# Patient Record
Sex: FEMALE | Race: BLACK OR AFRICAN AMERICAN | NOT HISPANIC OR LATINO | Employment: FULL TIME | URBAN - METROPOLITAN AREA
[De-identification: names, ages, dates, MRNs, and addresses within clinical notes are randomized per-mention and may not be internally consistent; named-entity substitution may affect disease eponyms.]

---
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
## 2025-04-17 ENCOUNTER — OFFICE VISIT (OUTPATIENT)
Dept: OBSTETRICS AND GYNECOLOGY | Age: 20
End: 2025-04-17
Payer: COMMERCIAL

## 2025-04-17 VITALS
SYSTOLIC BLOOD PRESSURE: 114 MMHG | HEIGHT: 65 IN | DIASTOLIC BLOOD PRESSURE: 66 MMHG | BODY MASS INDEX: 16.86 KG/M2 | WEIGHT: 101.2 LBS

## 2025-04-17 DIAGNOSIS — R63.6 UNDERWEIGHT (BMI < 18.5): ICD-10-CM

## 2025-04-17 DIAGNOSIS — O36.80X0 ENCOUNTER TO DETERMINE FETAL VIABILITY OF PREGNANCY, SINGLE OR UNSPECIFIED FETUS: ICD-10-CM

## 2025-04-17 DIAGNOSIS — Z15.09 BRCA1 GENETIC CARRIER: ICD-10-CM

## 2025-04-17 DIAGNOSIS — Z15.01 BRCA1 GENETIC CARRIER: ICD-10-CM

## 2025-04-17 DIAGNOSIS — Z01.419 ENCOUNTER FOR GYNECOLOGICAL EXAMINATION WITHOUT ABNORMAL FINDING: Primary | ICD-10-CM

## 2025-04-17 PROBLEM — Z34.01 ENCOUNTER FOR SUPERVISION OF NORMAL FIRST PREGNANCY IN FIRST TRIMESTER: Status: ACTIVE | Noted: 2025-04-17

## 2025-04-17 PROBLEM — R11.0 NAUSEA: Status: RESOLVED | Noted: 2022-01-23 | Resolved: 2025-04-17

## 2025-04-17 PROBLEM — R52 BODY ACHES: Status: RESOLVED | Noted: 2022-01-23 | Resolved: 2025-04-17

## 2025-04-17 PROBLEM — Z20.822 ENCOUNTER FOR LABORATORY TESTING FOR COVID-19 VIRUS: Status: RESOLVED | Noted: 2022-01-23 | Resolved: 2025-04-17

## 2025-04-17 PROBLEM — R42 DIZZINESS: Status: RESOLVED | Noted: 2022-01-23 | Resolved: 2025-04-17

## 2025-04-17 LAB — HCV AB S/CO SERPL IA: NEGATIVE

## 2025-04-17 NOTE — PROGRESS NOTES
[facility]   Obstetrics and Gynecology   Routine Annual Visit    2025    Patient: Carmelo Moss          MR#:[id number]    History of Present Illness    Chief Complaint   Patient presents with    Gynecologic Exam     New Gyn- U/S for fetal viability, pt has no complaints today, no previous pap smear.     19 y.o. female  who presents for annual exam and viability US.    Patient happy about pregnancy.  Not really experiencing any nausea or vomiting except when she takes her prenatal vitamin.  We discussed gummy options for this.    Reports that she has a BRCA1 gene carrier.  Her mother had breast cancer at the age of 29 and her maternal aunt also had it.  Reports her sister is also a gene carrier.    Obstetric History:  OB History          1    Para        Term                AB        Living             SAB        IAB        Ectopic        Molar        Multiple        Live Births                   Menstrual History:     Patient's last menstrual period was 2025.       Sexual History:   Monogamous relationship with male    Concern for IPV: denies  Dyspareunia: denies  Breast concerns: denies  Fecal/urine incontinence: denies  Concern for STI?: denies    Current contraception: none  History of abnormal Pap smear: n/a  Received Gardasil immunization:  no  History of abnormal mammogram: no - completed because carrier of BRCA1  Colon cancer screening: n/a  ________________________________________  Patient Active Problem List   Diagnosis    Encounter for laboratory testing for COVID-19 virus    Nausea    Dizziness    Body aches     Past Medical History:   Diagnosis Date     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     Ovarian cyst 2024    I had a small cyst removed from my tube 2024    Scoliosis      Past Surgical History:   Procedure Laterality Date    PELVIC LAPAROSCOPY  2024    WISDOM TOOTH EXTRACTION       Social History  "    Tobacco Use    Smoking status: Former     Current packs/day: 0.00     Types: Cigarettes     Passive exposure: Yes    Smokeless tobacco: Never   Vaping Use    Vaping status: Never Used   Substance Use Topics    Alcohol use: Never    Drug use: Never     Family History   Problem Relation Age of Onset    Breast cancer Mother     Asthma Father     Kidney disease Father     Breast cancer Maternal Aunt      Prior to Admission medications    Medication Sig Start Date End Date Taking? Authorizing Provider   albuterol sulfate  (90 Base) MCG/ACT inhaler Inhale 2 puffs Every 4 (Four) Hours As Needed.  Patient not taking: Reported on 4/17/2025 12/29/21   Emergency, Nurse Les, RN   doxycycline (MONODOX) 100 MG capsule Take 1 by mouth twice a day for 10 days  Patient not taking: Reported on 4/17/2025 6/25/23   Andreas Carrasquillo MD   norethindrone-ethinyl estradiol FE (Microgestin FE 1/20) 1-20 MG-MCG per tablet Take 1 tablet by mouth Daily.  Patient not taking: Reported on 4/17/2025 1/19/23   Provider, MD Cecelia   predniSONE (DELTASONE) 20 MG tablet Take 3 tablets po every morning  Patient not taking: Reported on 4/17/2025 6/25/23   Andreas Carrasquillo MD     ________________________________________    Review of Systems   Gastrointestinal:  Negative for nausea and vomiting.   Genitourinary:  Negative for vaginal bleeding and vaginal discharge.          Objective     /66   Ht 165.1 cm (65\")   Wt 45.9 kg (101 lb 3.2 oz)   LMP 02/14/2025   BMI 16.84 kg/m²    BP Readings from Last 3 Encounters:   04/17/25 114/66   06/25/23 103/69   03/15/22 104/72      Wt Readings from Last 3 Encounters:   04/17/25 45.9 kg (101 lb 3.2 oz)   06/25/23 50.3 kg (111 lb) (17%, Z= -0.97)*   03/15/22 48.1 kg (106 lb) (11%, Z= -1.20)*     * Growth percentiles are based on CDC (Girls, 2-20 Years) data.        BMI: Estimated body mass index is 16.84 kg/m² as calculated from the following:    Height as of this encounter: 165.1 cm " "(65\").    Weight as of this encounter: 45.9 kg (101 lb 3.2 oz).    Physical Exam  Vitals and nursing note reviewed.   Constitutional:       General: She is not in acute distress.     Appearance: Normal appearance.   HENT:      Head: Normocephalic and atraumatic.   Eyes:      Extraocular Movements: Extraocular movements intact.   Pulmonary:      Effort: Pulmonary effort is normal. No respiratory distress.   Chest:   Breasts:     Right: Normal. No mass, nipple discharge, skin change or tenderness.      Left: Normal. No mass, nipple discharge, skin change or tenderness.   Abdominal:      General: There is no distension.      Palpations: Abdomen is soft. There is no mass.      Tenderness: There is no abdominal tenderness.   Genitourinary:     General: Normal vulva.      Pubic Area: No rash.       Labia:         Right: No rash, lesion or injury.         Left: No rash, lesion or injury.       Urethra: No urethral lesion.      Vagina: Normal.      Cervix: Normal.      Uterus: Normal.       Adnexa: Right adnexa normal and left adnexa normal.      Rectum: Normal.   Lymphadenopathy:      Upper Body:      Right upper body: No supraclavicular or axillary adenopathy.      Left upper body: No supraclavicular or axillary adenopathy.   Skin:     General: Skin is warm and dry.   Neurological:      General: No focal deficit present.      Mental Status: She is alert.   Psychiatric:         Mood and Affect: Mood normal.         Behavior: Behavior normal.       Assessment:  Carmelo Moss is a 19 y.o.  presenting for well woman exam.       Encounter for gynecological examination without abnormal finding    Orders:    IGP, Rfx Aptima HPV ASCU    Encounter to determine fetal viability of pregnancy, single or unspecified fetus  Found to have viable intrauterine pregnancy at 8 weeks 6 days consistent with LMP.  Orders:    OB Panel With HIV and RPR    Varicella Zoster Antibody, IgG    Hemoglobin A1c    Urine Culture - Urine, " Urine, Clean Catch    Chlamydia trachomatis, Neisseria gonorrhoeae, Trichomonas vaginalis, PCR - Urine, Urine, Clean Catch    Hemoglobinopathy Fractionation Cascade; Future    Underweight (BMI < 18.5)  Discussed recommended weight gain 28-40 lbs       BRCA1 genetic carrier  Discussed genetic counseling - considering         As part of wellness and prevention, the following topics were discussed with the patient:  Encouraged self breast awareness  Physical activity and regular exercised encouraged.   Healthy weight discussed.  Nutrition discussed.  Substance abuse/misuse discussed.  Sexual transmitted disease prevention   Mental health discussed.     Plan:  Return in about 3 weeks (around 5/8/2025) for initial OB.    Maribell Leonardo MD  4/17/2025 10:02 EDT

## 2025-04-18 ENCOUNTER — PATIENT ROUNDING (BHMG ONLY) (OUTPATIENT)
Dept: OBSTETRICS AND GYNECOLOGY | Age: 20
End: 2025-04-18
Payer: COMMERCIAL

## 2025-04-18 LAB
HGB A MFR BLD ELPH: 97.7 % (ref 96.4–98.8)
HGB A2 MFR BLD ELPH: 2.3 % (ref 1.8–3.2)
HGB F MFR BLD ELPH: 0 % (ref 0–2)
HGB FRACT BLD-IMP: NORMAL
HGB S MFR BLD ELPH: 0 %

## 2025-04-19 LAB
ABO GROUP BLD: NORMAL
BACTERIA UR CULT: NO GROWTH
BACTERIA UR CULT: NORMAL
BASOPHILS # BLD AUTO: 0 X10E3/UL (ref 0–0.2)
BASOPHILS NFR BLD AUTO: 1 %
BLD GP AB SCN SERPL QL: NEGATIVE
C TRACH RRNA SPEC QL NAA+PROBE: NEGATIVE
EOSINOPHIL # BLD AUTO: 0.3 X10E3/UL (ref 0–0.4)
EOSINOPHIL NFR BLD AUTO: 5 %
ERYTHROCYTE [DISTWIDTH] IN BLOOD BY AUTOMATED COUNT: 12 % (ref 11.7–15.4)
HBA1C MFR BLD: 5.5 % (ref 4.8–5.6)
HBV SURFACE AG SERPL QL IA: NEGATIVE
HCT VFR BLD AUTO: 37.5 % (ref 34–46.6)
HCV AB SERPL QL IA: NON REACTIVE
HCV AB SERPL QL IA: NORMAL
HGB BLD-MCNC: 12.5 G/DL (ref 11.1–15.9)
HIV 1+2 AB+HIV1 P24 AG SERPL QL IA: NON REACTIVE
IMM GRANULOCYTES # BLD AUTO: 0 X10E3/UL (ref 0–0.1)
IMM GRANULOCYTES NFR BLD AUTO: 0 %
LYMPHOCYTES # BLD AUTO: 1.3 X10E3/UL (ref 0.7–3.1)
LYMPHOCYTES NFR BLD AUTO: 22 %
MCH RBC QN AUTO: 30.3 PG (ref 26.6–33)
MCHC RBC AUTO-ENTMCNC: 33.3 G/DL (ref 31.5–35.7)
MCV RBC AUTO: 91 FL (ref 79–97)
MONOCYTES # BLD AUTO: 0.4 X10E3/UL (ref 0.1–0.9)
MONOCYTES NFR BLD AUTO: 7 %
N GONORRHOEA RRNA SPEC QL NAA+PROBE: NEGATIVE
NEUTROPHILS # BLD AUTO: 3.9 X10E3/UL (ref 1.4–7)
NEUTROPHILS NFR BLD AUTO: 65 %
PLATELET # BLD AUTO: 329 X10E3/UL (ref 150–450)
RBC # BLD AUTO: 4.13 X10E6/UL (ref 3.77–5.28)
RH BLD: POSITIVE
RPR SER QL: NON REACTIVE
RUBV IGG SERPL IA-ACNC: 12.6 INDEX
T VAGINALIS RRNA SPEC QL NAA+PROBE: NEGATIVE
VZV IGG SER QL IA: REACTIVE
WBC # BLD AUTO: 6 X10E3/UL (ref 3.4–10.8)

## 2025-04-23 LAB
CONV .: NORMAL
CYTOLOGIST CVX/VAG CYTO: NORMAL
CYTOLOGY CVX/VAG DOC CYTO: NORMAL
CYTOLOGY CVX/VAG DOC THIN PREP: NORMAL
DX ICD CODE: NORMAL
Lab: NORMAL
OTHER STN SPEC: NORMAL
SERVICE CMNT-IMP: NORMAL
STAT OF ADQ CVX/VAG CYTO-IMP: NORMAL

## 2025-05-08 ENCOUNTER — INITIAL PRENATAL (OUTPATIENT)
Dept: OBSTETRICS AND GYNECOLOGY | Age: 20
End: 2025-05-08
Payer: COMMERCIAL

## 2025-05-08 VITALS — DIASTOLIC BLOOD PRESSURE: 66 MMHG | WEIGHT: 102.6 LBS | BODY MASS INDEX: 17.07 KG/M2 | SYSTOLIC BLOOD PRESSURE: 110 MMHG

## 2025-05-08 DIAGNOSIS — Z34.01 ENCOUNTER FOR SUPERVISION OF NORMAL FIRST PREGNANCY IN FIRST TRIMESTER: Primary | ICD-10-CM

## 2025-05-08 DIAGNOSIS — Z15.09 BRCA1 GENETIC CARRIER: ICD-10-CM

## 2025-05-08 DIAGNOSIS — R63.6 UNDERWEIGHT (BMI < 18.5): ICD-10-CM

## 2025-05-08 DIAGNOSIS — Z15.01 BRCA1 GENETIC CARRIER: ICD-10-CM

## 2025-05-08 LAB
CLARITY, POC: CLEAR
COLOR UR: YELLOW
EXTERNAL NIPT: NORMAL
GLUCOSE UR STRIP-MCNC: NEGATIVE MG/DL
PROT UR STRIP-MCNC: NEGATIVE MG/DL

## 2025-05-08 RX ORDER — ASPIRIN 81 MG/1
162 TABLET ORAL DAILY
Qty: 90 TABLET | Refills: 2 | Status: SHIPPED | OUTPATIENT
Start: 2025-05-08

## 2025-05-08 NOTE — ASSESSMENT & PLAN NOTE
-Pap: 4/2025 NILM  -Plan for anatomy Us at 18-22 wga  -Tdap > 27wga  -Contraception: discuss at future visit  -Birthplan: discuss at future visit  -Care coordination: accepts blood transfusions, no latex allergy, call schedule reviewed  - ASA indicated for nulliparity, race    Orders:    Jorge Luis Meyerma Prenatal Test: Chromosomes 13, 18, 21, X & Y: Triploidy 22Q.11.2 Deletion - Blood,    POC Urinalysis Dipstick    Jorge Luis Horizon 14 (Pan-Ethnic Standard) - Blood,    aspirin 81 MG EC tablet; Take 2 tablets by mouth Daily.

## 2025-05-08 NOTE — PROGRESS NOTES
[facility]  Obstetrics and Gynecology   New Obstetric Visit    2025    Patient: Carmelo Moss          MR#:[id number]    History of Present Illness    Chief Complaint   Patient presents with    Initial Prenatal Visit     11w6d ob check, genetic testing w/ gender today, Pt has no complaints today     19 y.o. female  at 11w6d presents for NOB visit.  She is doing well today.  Taking prenatal vitamins.    Patient reports she is a BRCA1 carrier.  Patient and father of baby deny family history of other genetic disorders, congenital heart disease, neural tube defect, bleeding / clotting disorder, autism / developmental delay    ________________________________________  Patient Active Problem List   Diagnosis    Encounter for supervision of normal first pregnancy in first trimester    Underweight (BMI < 18.5)    BRCA1 genetic carrier     OB History          1    Para        Term                AB        Living             SAB        IAB        Ectopic        Molar        Multiple        Live Births                    Past Medical History:   Diagnosis Date    [place] 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     Ovarian cyst 2024    I had a small cyst removed from my tube 2024    Scoliosis      Past Surgical History:   Procedure Laterality Date    PELVIC LAPAROSCOPY  2024    WISDOM TOOTH EXTRACTION       Social History     Tobacco Use    Smoking status: Former     Current packs/day: 0.00     Types: Cigarettes     Passive exposure: Yes    Smokeless tobacco: Never   Vaping Use    Vaping status: Never Used   Substance Use Topics    Alcohol use: Never    Drug use: Never     Family History   Problem Relation Age of Onset    Asthma Father     Kidney disease Father     Breast cancer Mother 29    Breast cancer Maternal Aunt      Prior to Admission medications    Not on File     ________________________________________    Review of Systems   Gastrointestinal:   "Negative for constipation, nausea and vomiting.   Genitourinary:  Negative for vaginal bleeding.          Objective     /66   Wt 46.5 kg (102 lb 9.6 oz)   LMP 2025   BMI 17.07 kg/m²    BP Readings from Last 3 Encounters:   25 110/66   25 114/66   23 103/69      Wt Readings from Last 3 Encounters:   25 46.5 kg (102 lb 9.6 oz)   25 45.9 kg (101 lb 3.2 oz)   23 50.3 kg (111 lb) (17%, Z= -0.97)*     * Growth percentiles are based on CDC (Girls, 2-20 Years) data.        BMI: Estimated body mass index is 17.07 kg/m² as calculated from the following:    Height as of 25: 165.1 cm (65\").    Weight as of this encounter: 46.5 kg (102 lb 9.6 oz).    Physical Exam  Vitals and nursing note reviewed.   Constitutional:       General: She is not in acute distress.     Appearance: Normal appearance.   HENT:      Head: Normocephalic and atraumatic.   Eyes:      Extraocular Movements: Extraocular movements intact.   Pulmonary:      Effort: Pulmonary effort is normal. No respiratory distress.   Abdominal:      General: There is no distension.   Skin:     General: Skin is warm and dry.   Neurological:      General: No focal deficit present.      Mental Status: She is alert.   Psychiatric:         Mood and Affect: Mood normal.         Behavior: Behavior normal.       Assessment:  Carmelo Moss is a 19 y.o.  at 11w6d presenting for new OB visit.       Encounter for supervision of normal first pregnancy in first trimester  -Pap: 2025 NILM  -Plan for anatomy Us at 18-22 wga  -Tdap > 27wga  -Contraception: discuss at future visit  -Birthplan: discuss at future visit  -Care coordination: accepts blood transfusions, no latex allergy, call schedule reviewed  - ASA indicated for nulliparity, race    Orders:    Jorge Luis Panorama Prenatal Test: Chromosomes 13, 18, 21, X & Y: Triploidy 22Q.11.2 Deletion - Blood,    POC Urinalysis Dipstick    Jorge Luis Horizon 14 (Pan-Ethnic " Standard) - Blood,    aspirin 81 MG EC tablet; Take 2 tablets by mouth Daily.    BRCA1 genetic carrier  Plan for post-partum referral to high risk breast clinic.          Underweight (BMI < 18.5)  Discussed goal total weight gain of 28 to 40 pounds         Nutrition and weight gain were addressed.  Encouraged exercise at least 3 x weekly   Plan:  Return for F/u prenatal 4 wk.    Maribell Leonardo MD  5/8/2025 10:29 EDT

## 2025-05-17 LAB
DEPRECATED FRAXE GENE CGG RPT BLD/T QL: NEGATIVE
Lab: NEGATIVE
Lab: NORMAL
NTRA ALPHA-THALASSEMIA: NEGATIVE
NTRA BETA-HEMOGLOBINOPATHIES: NEGATIVE
NTRA CANAVAN DISEASE: NEGATIVE
NTRA CYSTIC FIBROSIS: NEGATIVE
NTRA DUCHENNE/BECKER MUSCULAR DYSTROPHY: NEGATIVE
NTRA FAMILIAL DYSAUTONOMIA: NEGATIVE
NTRA FRAGILE X SYNDROME: NEGATIVE
NTRA GALACTOSEMIA: NEGATIVE
NTRA GAUCHER DISEASE: NEGATIVE
NTRA MEDIUM CHAIN ACYL-COA DEHYDROGENASE DEFICIENCY: NEGATIVE
NTRA POLYCYSTIC KIDNEY DISEASE, AUTOSOMAL RECESSIVE: NEGATIVE
NTRA SMITH-LEMLI-OPITZ SYNDROME: NEGATIVE
NTRA SPINAL MUSCULAR ATROPHY: NEGATIVE
NTRA TAY-SACHS DISEASE: NEGATIVE

## 2025-06-05 ENCOUNTER — ROUTINE PRENATAL (OUTPATIENT)
Dept: OBSTETRICS AND GYNECOLOGY | Age: 20
End: 2025-06-05
Payer: COMMERCIAL

## 2025-06-05 VITALS — SYSTOLIC BLOOD PRESSURE: 100 MMHG | WEIGHT: 105.2 LBS | BODY MASS INDEX: 17.51 KG/M2 | DIASTOLIC BLOOD PRESSURE: 56 MMHG

## 2025-06-05 DIAGNOSIS — Z34.01 ENCOUNTER FOR SUPERVISION OF NORMAL FIRST PREGNANCY IN FIRST TRIMESTER: Primary | ICD-10-CM

## 2025-06-05 DIAGNOSIS — Z15.01 BRCA1 GENETIC CARRIER: ICD-10-CM

## 2025-06-05 DIAGNOSIS — Z13.89 SCREENING FOR BLOOD OR PROTEIN IN URINE: ICD-10-CM

## 2025-06-05 DIAGNOSIS — Z15.09 BRCA1 GENETIC CARRIER: ICD-10-CM

## 2025-06-05 DIAGNOSIS — R63.6 UNDERWEIGHT (BMI < 18.5): ICD-10-CM

## 2025-06-05 LAB
CLARITY, POC: CLEAR
COLOR UR: YELLOW
GLUCOSE UR STRIP-MCNC: NEGATIVE MG/DL
PROT UR STRIP-MCNC: NEGATIVE MG/DL

## 2025-06-05 NOTE — ASSESSMENT & PLAN NOTE
-Pap: 4/2025 NILM  -Plan for anatomy Us at 18-22 wga  -Tdap > 27wga  -Contraception: discuss at future visit  -Birthplan: discuss at future visit  -Care coordination: accepts blood transfusions, no latex allergy, call schedule reviewed  - ASA indicated for nulliparity, race    Orders:    Alpha Fetoprotein, Maternal

## 2025-06-05 NOTE — PROGRESS NOTES
[facility]  Obstetrics and Gynecology   Return Obstetric Visit    Carmelo Moss, a 19 y.o.  at 15w6d, presents for OB follow-up.  She is doing well today.  Denies loss of fluid, vaginal bleeding, and pelvic pain / contractions.  Not yet feeling fetal movements.    Having some discomfort vaginally only with night time voids. No dysuria, hematuria. No worsening or persistence. Having BM 3x/wk without straining. Denies nausea/vomiting    Objective  BP Readings from Last 3 Encounters:   25 110/66   25 114/66   23 103/69      Wt Readings from Last 3 Encounters:   25 47.7 kg (105 lb 3.2 oz)   25 46.5 kg (102 lb 9.6 oz)   25 45.9 kg (101 lb 3.2 oz)      Total weight gain this pregnancy: 1.905 kg (4 lb 3.2 oz)    General: Awake, alert, no apparent distress  Respiratory: No increased work of breathing  Abdomen: Fundus soft and nontender  Extremity: Nontender, no edema    Last OB US Data (since 2024)       None            Carmelo Moss is a 19 y.o.  at 15w6d presenting for OB follow-up.    Intrauterine pregnancy at 15w6d   Pregnancy Risk:  NORMAL       Encounter for supervision of normal first pregnancy in first trimester  -Pap: 2025 NILM  -Plan for anatomy Us at 18-22 wga  -Tdap > 27wga  -Contraception: discuss at future visit  -Birthplan: discuss at future visit  -Care coordination: accepts blood transfusions, no latex allergy, call schedule reviewed  - ASA indicated for nulliparity, race    Orders:    Alpha Fetoprotein, Maternal     Screening for blood or protein in urine    Orders:    POC Urinalysis Dipstick    BRCA1 genetic carrier  Plan for post-partum referral to high risk breast clinic.          Underweight (BMI < 18.5)  Discussed goal total weight gain of 28 to 40 pounds. TWG thus far 4 lb            Nutrition and weight gain were addressed.  Practice OB call structure was discussed.   Return for F/u prenatal 4 wk.    Maribell Leonardo,  MD  6/5/2025 08:38 EDT

## 2025-06-07 LAB
AFP INTERP SERPL-IMP: NORMAL
AFP INTERP SERPL-IMP: NORMAL
AFP MOM SERPL: 0.66
AFP SERPL-MCNC: 30.2 NG/ML
AGE AT DELIVERY: 22.2 YR
GA METHOD: NORMAL
GA: 15.9 WEEKS
IDDM PATIENT QL: NO
LABORATORY COMMENT REPORT: NORMAL
MULTIPLE PREGNANCY: NO
NEURAL TUBE DEFECT RISK FETUS: NORMAL %
RESULT: NORMAL

## 2025-07-03 ENCOUNTER — ROUTINE PRENATAL (OUTPATIENT)
Dept: OBSTETRICS AND GYNECOLOGY | Age: 20
End: 2025-07-03
Payer: COMMERCIAL

## 2025-07-03 VITALS — WEIGHT: 111.6 LBS | SYSTOLIC BLOOD PRESSURE: 108 MMHG | BODY MASS INDEX: 18.57 KG/M2 | DIASTOLIC BLOOD PRESSURE: 62 MMHG

## 2025-07-03 DIAGNOSIS — Z13.89 SCREENING FOR BLOOD OR PROTEIN IN URINE: ICD-10-CM

## 2025-07-03 DIAGNOSIS — Z15.01 BRCA1 GENETIC CARRIER: ICD-10-CM

## 2025-07-03 DIAGNOSIS — Z34.02 ENCOUNTER FOR SUPERVISION OF NORMAL FIRST PREGNANCY IN SECOND TRIMESTER: Primary | ICD-10-CM

## 2025-07-03 DIAGNOSIS — Z15.09 BRCA1 GENETIC CARRIER: ICD-10-CM

## 2025-07-03 DIAGNOSIS — R63.6 UNDERWEIGHT (BMI < 18.5): ICD-10-CM

## 2025-07-03 LAB
BILIRUB BLD-MCNC: NEGATIVE MG/DL
CLARITY, POC: CLEAR
COLOR UR: YELLOW
GLUCOSE UR STRIP-MCNC: NEGATIVE MG/DL
KETONES UR QL: NEGATIVE
LEUKOCYTE EST, POC: NEGATIVE
NITRITE UR-MCNC: NEGATIVE MG/ML
PH UR: 7 [PH] (ref 5–8)
PROT UR STRIP-MCNC: NEGATIVE MG/DL
RBC # UR STRIP: NEGATIVE /UL
SP GR UR: 1.01 (ref 1–1.03)
UROBILINOGEN UR QL: NORMAL

## 2025-07-03 NOTE — ASSESSMENT & PLAN NOTE
-Pap: 4/2025 NILM  -Anatomy incomplete today for 4 chamber view- repeat next visit  -Tdap > 27wga  -Contraception: discuss at future visit  -Birthplan: discuss at future visit  -Care coordination: accepts blood transfusions, no latex allergy, call schedule reviewed  - ASA indicated for nulliparity, race

## 2025-07-03 NOTE — PROGRESS NOTES
[facility]  Obstetrics and Gynecology   Return Obstetric Visit    Carmelo Moss, a 19 y.o.  at 19w6d, presents for OB follow-up.  She is doing well today.  Denies loss of fluid, vaginal bleeding, and contractions.  Endorses fetal movements.    Having some intermittent, left sided back pain that is brief. No dysuria, hematuria    NL 46, XY (male)    Objective  BP Readings from Last 3 Encounters:   25 108/62   25 100/56   25 110/66      Wt Readings from Last 3 Encounters:   25 50.6 kg (111 lb 9.6 oz)   25 47.7 kg (105 lb 3.2 oz)   25 46.5 kg (102 lb 9.6 oz)      Total weight gain this pregnancy: 4.808 kg (10 lb 9.6 oz)    General: Awake, alert, no apparent distress  Respiratory: No increased work of breathing  Abdomen: Fundus soft and nontender  Extremity: Nontender, no edema    Last OB US Data (since 12/15/2024)         Value Time User    Anatomic survey  Incomplete 7/3/2025  9:00 AM O&#39;Maribell Diop MD    Placenta location  Anterior 7/3/2025  9:00 AM O&#39;Maribell Diop MD    Previa  no previa 7/3/2025  9:00 AM O&#39;Maribell Diop MD            Carmelo Moss is a 19 y.o.  at 19w6d presenting for OB follow-up.    Intrauterine pregnancy at 19w6d   Pregnancy Risk:  NORMAL       Encounter for supervision of normal first pregnancy in second trimester  -Pap: 2025 NILM  -Anatomy incomplete today for 4 chamber view- repeat next visit  -Tdap > 27wga  -Contraception: discuss at future visit  -Birthplan: discuss at future visit  -Care coordination: accepts blood transfusions, no latex allergy, call schedule reviewed  - ASA indicated for nulliparity, race          Screening for blood or protein in urine    Orders:    POC Urinalysis Dipstick    BRCA1 genetic carrier  Plan for post-partum referral to high risk breast clinic.          Underweight (BMI < 18.5)  Discussed goal total weight gain of 28 to 40 pounds. TWG thus far 10 lb             labor  was discussed.  Warnings were provided.  Nutrition and weight gain were addressed.  Return for F/u prenatal 4 wk.    Maribell Leonardo MD  7/3/2025 09:02 EDT

## 2025-08-14 ENCOUNTER — ROUTINE PRENATAL (OUTPATIENT)
Dept: OBSTETRICS AND GYNECOLOGY | Age: 20
End: 2025-08-14
Payer: COMMERCIAL

## 2025-08-14 VITALS — DIASTOLIC BLOOD PRESSURE: 66 MMHG | BODY MASS INDEX: 19.57 KG/M2 | SYSTOLIC BLOOD PRESSURE: 110 MMHG | WEIGHT: 117.6 LBS

## 2025-08-14 DIAGNOSIS — Z34.02 ENCOUNTER FOR SUPERVISION OF NORMAL FIRST PREGNANCY IN SECOND TRIMESTER: Primary | ICD-10-CM

## 2025-08-14 DIAGNOSIS — Z13.89 SCREENING FOR BLOOD OR PROTEIN IN URINE: ICD-10-CM

## 2025-08-14 DIAGNOSIS — R63.6 UNDERWEIGHT (BMI < 18.5): ICD-10-CM

## 2025-08-14 DIAGNOSIS — Z15.01 BRCA1 GENETIC CARRIER: ICD-10-CM

## 2025-08-14 DIAGNOSIS — Z15.09 BRCA1 GENETIC CARRIER: ICD-10-CM

## 2025-08-14 LAB
BILIRUB BLD-MCNC: NEGATIVE MG/DL
CLARITY, POC: CLEAR
COLOR UR: YELLOW
GLUCOSE UR STRIP-MCNC: NEGATIVE MG/DL
KETONES UR QL: NEGATIVE
LEUKOCYTE EST, POC: NEGATIVE
NITRITE UR-MCNC: NEGATIVE MG/ML
PH UR: 7 [PH] (ref 5–8)
PROT UR STRIP-MCNC: NEGATIVE MG/DL
RBC # UR STRIP: NEGATIVE /UL
SP GR UR: 1.02 (ref 1–1.03)
UROBILINOGEN UR QL: NORMAL